# Patient Record
Sex: FEMALE | Race: WHITE | NOT HISPANIC OR LATINO | Employment: FULL TIME | URBAN - NONMETROPOLITAN AREA
[De-identification: names, ages, dates, MRNs, and addresses within clinical notes are randomized per-mention and may not be internally consistent; named-entity substitution may affect disease eponyms.]

---

## 2023-06-26 ENCOUNTER — EVALUATION (OUTPATIENT)
Dept: PHYSICAL THERAPY | Facility: CLINIC | Age: 63
End: 2023-06-26
Payer: COMMERCIAL

## 2023-06-26 DIAGNOSIS — Z96.651 PRESENCE OF RIGHT ARTIFICIAL KNEE JOINT: ICD-10-CM

## 2023-06-26 DIAGNOSIS — M25.561 ACUTE PAIN OF RIGHT KNEE: Primary | ICD-10-CM

## 2023-06-26 PROCEDURE — 97110 THERAPEUTIC EXERCISES: CPT | Performed by: PHYSICAL THERAPIST

## 2023-06-26 PROCEDURE — 97161 PT EVAL LOW COMPLEX 20 MIN: CPT | Performed by: PHYSICAL THERAPIST

## 2023-06-26 NOTE — LETTER
2023    Karen Michael, [address] [address]    Patient: Sary Jenkins   YOB: 1960   Date of Visit: 2023     Encounter Diagnosis     ICD-10-CM    1  Acute pain of right knee  M25 561       2  Presence of right artificial knee joint  Z96 651           Dear Dr Esha Hill:    Thank you for your recent referral of Sary Jenkins  Please review the attached evaluation summary from Angie's recent visit  Please verify that you agree with the plan of care by signing the attached order  If you have any questions or concerns, please do not hesitate to call  I sincerely appreciate the opportunity to share in the care of one of your patients and hope to have another opportunity to work with you in the near future  Sincerely,    Juan Carlos De La Rosa      Referring Provider:      I certify that I have read the below Plan of Care and certify the need for these services furnished under this plan of treatment while under my care  Karen Michael MD  [address] [place] [address]  Via Fax: [phone]          PT Evaluation     Today's date: 2023  Patient name: Sary Jenkins  : 1960  MRN: [id number]  Referring provider: Denisse Robledo MD  Dx:   Encounter Diagnosis     ICD-10-CM    1  Acute pain of right knee  M25 561       2  Presence of right artificial knee joint  Z96 651           Start Time: 1300  Stop Time: 1410  Total time in clinic (min): 70 minutes    Assessment  Assessment details: Patient presents to outpatient clinic post op R TKA  Patient tolerated the evaluation well  Patient showed good quadriceps contraction and no quad lag during a straight leg raise  Patient is lacking 8 degrees of knee extension on the left but is able to reach 90 degrees of knee flexion actively   Patient showed good patellar mobility medially and laterally but is lacking mobility inferiorly and superiorly that may be due to the edema currently present around the knee  Patient was instructed on adjusting exercises she was given before the operation and was instructed on lowering the dosage to allow time for the R knee to heal  Patient was also instructed on normal healing times for the knee and how it will progress during physical therapy  Impairments: activity intolerance, lacks appropriate home exercise program and pain with function    Goals  STGs    Patient will increase right knee flexion AROM from 91 to 110 degrees in 3 weeks to be able to use stairs with a step through pattern  Patient will increase right knee extension AROM from -8 to -3 degrees in 3 weeks to be able to tolerate normal ADL's  Patient will increase right knee flexion strength from 4 to 4+ by discharge in 3 weeks to be able to tolerate normal ADL's  LTGs    Patient will increase FOTO score from 41 to 77 by discharge to be able to complete ADL's  Patient will increase right knee extension strength from 4 to 5 by discharge to be able to tolerate normal ADL's  Patient will decrease R knee edema from 39 8 cm to 34 cm by discharge to be able to complete ADL's as normal     Plan  Plan details: Patient will have deficits addressed that were found in the evaluation today  Patient will benefit from PT to address R knee ROM and strength to be able to get back to walking their normal mileage and their normal ADL's  Planned modality interventions: cryotherapy  Planned therapy interventions: manual therapy, balance/weight bearing training, neuromuscular re-education, strengthening, stretching, therapeutic activities, therapeutic exercise and home exercise program  Frequency: 2x week  Duration in weeks: 6  Plan of Care beginning date: 6/26/2023  Plan of Care expiration date: 8/7/2023        Subjective Evaluation    History of Present Illness  Mechanism of injury: surgery  Mechanism of injury: Patient presents to outpatient clinic post op R TKA   Patient reports doing "well since the operation on 6/15/23 and is currently having difficulty managing the post op swelling  Patient reports they started exercises provided by their physician before the surgery and they have been continuing them  Patient expresses walking upstairs and standing for more than 20 minutes at a time has been difficult for them  Patient disclosed prior medical history of diabetes and thyroid problems  Quality of life: good    Pain  Current pain ratin  At best pain ratin  At worst pain ratin  Quality: tight, discomfort and cramping  Aggravating factors: stair climbing, walking and standing    Social Support  Steps to enter house: yes  Stairs in house: yes   Lives in: multiple-level home    Treatments  Previous treatment: medication  Current treatment: medication and physical therapy  Patient Goals  Patient goals for therapy: decreased edema, decreased pain and increased strength  Patient goal: Be able to sleep at night without pain        Objective     Observations     Right Knee   Positive for edema, effusion and incision  Negative for drainage  Additional Observation Details  Redness ivan incision  Small not raised dots extending full length of incision approximately 3/4\" wide which patient discloses is where the bandage covering the staples and incision was located      Active Range of Motion   Left Knee   Flexion: 141 degrees   Extension: 5 degrees     Right Knee   Flexion: 91 degrees   Extension: -9 degrees   Extensor la degrees     Passive Range of Motion   Left Knee   Extension: 5 degrees     Right Knee   Extension: -8 degrees     Mobility   Patellar Mobility:     Right Knee   WFL: medial and lateral  Hypomobile: superior and inferior     Strength/Myotome Testing     Left Knee   Quadriceps contraction: good    Right Knee   Flexion: 4  Extension: 4  Quadriceps contraction: good    Swelling     Left Knee Girth Measurement (cm)   Joint line: 34 cm    Right Knee Girth Measurement (cm) " "  Joint line: 39 8 cm      Flowsheet Rows    Flowsheet Row Most Recent Value   PT/OT G-Codes    Current Score 44   Projected Score 77            Precautions: N/A    Date 6/26 IE       FOTO        Manuals        Passive knee ext NV       Gastroc stretch NV                       Neuro Re-Ed        SLR NV                                                       Ther Ex        Heel slide 5\" 15x       Towel crush        Quad set                                                Ther Activity        Step Ups NV               Gait Training                        Modalities                                              "

## 2023-06-26 NOTE — PROGRESS NOTES
PT Evaluation     Today's date: 2023  Patient name: Neo Isbell  : 1960  MRN: [id number]  Referring provider: Samantha Barnhart MD  Dx:   Encounter Diagnosis     ICD-10-CM    1  Acute pain of right knee  M25 561       2  Presence of right artificial knee joint  Z96 651           Start Time: 1300  Stop Time: 1410  Total time in clinic (min): 70 minutes    Assessment  Assessment details: Patient presents to outpatient clinic post op R TKA  Patient tolerated the evaluation well  Patient showed good quadriceps contraction and no quad lag during a straight leg raise  Patient is lacking 8 degrees of knee extension on the left but is able to reach 90 degrees of knee flexion actively  Patient showed good patellar mobility medially and laterally but is lacking mobility inferiorly and superiorly that may be due to the edema currently present around the knee  Patient was instructed on adjusting exercises she was given before the operation and was instructed on lowering the dosage to allow time for the R knee to heal  Patient was also instructed on normal healing times for the knee and how it will progress during physical therapy  Impairments: activity intolerance, lacks appropriate home exercise program and pain with function    Goals  STGs    Patient will increase right knee flexion AROM from 91 to 110 degrees in 3 weeks to be able to use stairs with a step through pattern  Patient will increase right knee extension AROM from -8 to -3 degrees in 3 weeks to be able to tolerate normal ADL's  Patient will increase right knee flexion strength from 4 to 4+ by discharge in 3 weeks to be able to tolerate normal ADL's  LTGs    Patient will increase FOTO score from 41 to 77 by discharge to be able to complete ADL's  Patient will increase right knee extension strength from 4 to 5 by discharge to be able to tolerate normal ADL's    Patient will decrease R knee edema from 39 8 cm to 34 cm by discharge to be able to complete ADL's as normal     Plan  Plan details: Patient will have deficits addressed that were found in the evaluation today  Patient will benefit from PT to address R knee ROM and strength to be able to get back to walking their normal mileage and their normal ADL's  Planned modality interventions: cryotherapy  Planned therapy interventions: manual therapy, balance/weight bearing training, neuromuscular re-education, strengthening, stretching, therapeutic activities, therapeutic exercise and home exercise program  Frequency: 2x week  Duration in weeks: 6  Plan of Care beginning date: 2023  Plan of Care expiration date: 2023        Subjective Evaluation    History of Present Illness  Mechanism of injury: surgery  Mechanism of injury: Patient presents to outpatient clinic post op R TKA  Patient reports doing well since the operation on 6/15/23 and is currently having difficulty managing the post op swelling  Patient reports they started exercises provided by their physician before the surgery and they have been continuing them  Patient expresses walking upstairs and standing for more than 20 minutes at a time has been difficult for them  Patient disclosed prior medical history of diabetes and thyroid problems  Quality of life: good    Pain  Current pain ratin  At best pain ratin  At worst pain ratin  Quality: tight, discomfort and cramping  Aggravating factors: stair climbing, walking and standing    Social Support  Steps to enter house: yes  Stairs in house: yes   Lives in: multiple-level home    Treatments  Previous treatment: medication  Current treatment: medication and physical therapy  Patient Goals  Patient goals for therapy: decreased edema, decreased pain and increased strength  Patient goal: Be able to sleep at night without pain        Objective     Observations     Right Knee   Positive for edema, effusion and incision  Negative for drainage       Additional Observation "Details  Redness ivan incision  Small not raised dots extending full length of incision approximately 3/4\" wide which patient discloses is where the bandage covering the staples and incision was located      Active Range of Motion   Left Knee   Flexion: 141 degrees   Extension: 5 degrees     Right Knee   Flexion: 91 degrees   Extension: -9 degrees   Extensor la degrees     Passive Range of Motion   Left Knee   Extension: 5 degrees     Right Knee   Extension: -8 degrees     Mobility   Patellar Mobility:     Right Knee   WFL: medial and lateral  Hypomobile: superior and inferior     Strength/Myotome Testing     Left Knee   Quadriceps contraction: good    Right Knee   Flexion: 4  Extension: 4  Quadriceps contraction: good    Swelling     Left Knee Girth Measurement (cm)   Joint line: 34 cm    Right Knee Girth Measurement (cm)   Joint line: 39 8 cm      Flowsheet Rows    Flowsheet Row Most Recent Value   PT/OT G-Codes    Current Score 44   Projected Score 77             Precautions: N/A    Date  IE       FOTO        Manuals        Passive knee ext NV       Gastroc stretch NV                       Neuro Re-Ed        SLR NV                                                       Ther Ex        Heel slide 5\" 15x       Towel crush        Quad set                                                Ther Activity        Step Ups NV               Gait Training                        Modalities                             "

## 2023-07-05 ENCOUNTER — OFFICE VISIT (OUTPATIENT)
Dept: PHYSICAL THERAPY | Facility: CLINIC | Age: 63
End: 2023-07-05
Payer: COMMERCIAL

## 2023-07-05 DIAGNOSIS — Z96.651 PRESENCE OF RIGHT ARTIFICIAL KNEE JOINT: ICD-10-CM

## 2023-07-05 DIAGNOSIS — M25.561 ACUTE PAIN OF RIGHT KNEE: Primary | ICD-10-CM

## 2023-07-05 PROCEDURE — 97140 MANUAL THERAPY 1/> REGIONS: CPT | Performed by: PHYSICAL THERAPIST

## 2023-07-05 PROCEDURE — 97110 THERAPEUTIC EXERCISES: CPT | Performed by: PHYSICAL THERAPIST

## 2023-07-05 NOTE — PROGRESS NOTES
Daily Note     Today's date: 2023  Patient name: Martina Verduzco  : 1960  MRN: [id number]  Referring provider: Nicky Menjivar MD  Dx:   Encounter Diagnosis     ICD-10-CM    1. Acute pain of right knee  M25.561       2. Presence of right artificial knee joint  Z96.651           Start Time: 1400  Stop Time: 1455  Total time in clinic (min): 55 minutes    Subjective: Patient reports knee and ankle is stiff. Patient reports difficulty sleeping due to an increase in pain in the R knee. Patient is continuing to have difficulty walking for more than 20 minutes and climbing stairs with a normal pattern. Objective: See treatment diary below      Assessment: Tolerated treatment well. Patient demonstrated fatigue post treatment. Patient showed good quad strength with exercises. Patient showed some difficulty with initial heel slides secondary to tightness in the knee. Patient showed improvement in knee ROM as they continued heel slides. Patient showed good hip and knee control during SLB and tandem stance. Patient was instructed on continuing home exercises provided to them but to limit over doing exercises. Plan: Continue per plan of care.       Precautions: N/A    Date  IE       FOTO        Manuals        Passive knee ext NV ZJ 5 min      Gastroc stretch NV ZJ 30" 4x & ham                      Neuro Re-Ed        SLR NV 20x      SLB  5x 10"      Tandem stance  5x 10" ea                                      Ther Ex        Heel slide 5" 15x 20x 5"      Towel crush  20x 5"      Quad set        NuStep for strengthening  L5 8 min                                      Ther Activity        Step Ups NV 20x              Gait Training        SPC  8 min              Modalities

## 2023-07-07 ENCOUNTER — OFFICE VISIT (OUTPATIENT)
Dept: PHYSICAL THERAPY | Facility: CLINIC | Age: 63
End: 2023-07-07
Payer: COMMERCIAL

## 2023-07-07 DIAGNOSIS — Z96.651 PRESENCE OF RIGHT ARTIFICIAL KNEE JOINT: Primary | ICD-10-CM

## 2023-07-07 DIAGNOSIS — M25.561 ACUTE PAIN OF RIGHT KNEE: ICD-10-CM

## 2023-07-07 PROCEDURE — 97110 THERAPEUTIC EXERCISES: CPT

## 2023-07-07 PROCEDURE — 97140 MANUAL THERAPY 1/> REGIONS: CPT

## 2023-07-07 PROCEDURE — 97112 NEUROMUSCULAR REEDUCATION: CPT

## 2023-07-07 NOTE — PROGRESS NOTES
Daily Note     Today's date: 2023  Patient name: Sweta Vega  : 1960  MRN: [id number]  Referring provider: Curtis Hernandez MD  Dx:   Encounter Diagnosis     ICD-10-CM    1. Presence of right artificial knee joint  Z96.651       2. Acute pain of right knee  M25.561           Start Time: 1100  Stop Time: 1200  Total time in clinic (min): 60 minutes    Subjective: I have stiffness in my knee today. I am still using the walker. Objective: See treatment diary below      Assessment: Tolerated treatment well. Patient would benefit from continued PT  PT was able to get to 112 of knee flexion today using the green strap. We added some new exercises today with good tolerance. We continue to work on her motion and strength. She is able to walk with the cane for shorter distances. Her extension is improving , but, still gets strong pain with passive ext. Pt was shown how to do some stretching for her calf and knee at home. We ended with a cold pack to her right knee for 10 min in supine. Plan: Continue per plan of care.       Precautions: N/A    Date  IE      FOTO        Manuals        Passive knee ext NV ZJ 5 min 5 min JF     Gastroc stretch NV ZJ 30" 4x & ham JF  4 x 30 "                     Neuro Re-Ed        SLR NV 20x 20 x     SLB  5x 10" 5 x 10 "      Tandem stance  5x 10" ea 5 x 10 " ea     Tandem walking   5 laps                             Ther Ex        Heel slide 5" 15x 20x 5" 20 x 5 "      Towel crush  20x 5" 20 x  5 "     LAQ   1 # 15 X      Quad set        NuStep for strengthening  L5 8 min L 5  8min     Heel raise   20 x     Standing abd    20x ea     SHC   20 x      Hokie Pokie   20 x      Ther Activity        Step Ups NV 20x 20x  6 "              Gait Training        SPC  8 min              Modalities

## 2023-07-11 ENCOUNTER — OFFICE VISIT (OUTPATIENT)
Dept: PHYSICAL THERAPY | Facility: CLINIC | Age: 63
End: 2023-07-11
Payer: COMMERCIAL

## 2023-07-11 DIAGNOSIS — Z96.651 PRESENCE OF RIGHT ARTIFICIAL KNEE JOINT: Primary | ICD-10-CM

## 2023-07-11 PROCEDURE — 97140 MANUAL THERAPY 1/> REGIONS: CPT

## 2023-07-11 PROCEDURE — 97110 THERAPEUTIC EXERCISES: CPT

## 2023-07-11 PROCEDURE — 97112 NEUROMUSCULAR REEDUCATION: CPT

## 2023-07-11 NOTE — PROGRESS NOTES
Daily Note     Today's date: 2023  Patient name: Viri Moran  : 1960  MRN: [id number]  Referring provider: Irvin Figueroa MD  Dx:   Encounter Diagnosis     ICD-10-CM    1. Presence of right artificial knee joint  Z96.651           Start Time: 1100  Stop Time: 1200  Total time in clinic (min): 60 minutes    Subjective: My knee is stiff today. I have swelling in it. Objective: See treatment diary below      Assessment: Tolerated treatment well. Patient would benefit from continued PT pt reports having more pain today with knee flexion due to more swelling. She was able to get to 110 of passive knee flexion using the SOS strap. Pt continues to use the walker and cane for ambulation. She had discoloration down her right leg into her right ankle. We  Continue to work on her motion and strength. She is doing her exercises at home. We iced post for 10 min. Pt walked out of the clinic today using her cane. She did need a few verbal cues , but, did well. Plan: Continue per plan of care.       Precautions: N/A    Date  IE     FOTO        Manuals        Passive knee ext NV ZJ 5 min 5 min JF 5 min    Gastroc stretch NV ZJ 30" 4x & ham JF  4 x 30 "  JF 4 x 30 "                     Neuro Re-Ed        SLR NV 20x 20 x 20 x    SLB  5x 10" 5 x 10 "  5 " 10 x    Tandem stance  5x 10" ea 5 x 10 " ea 5 " 10 x    Tandem walking   5 laps 5 laps                            Ther Ex        Heel slide 5" 15x 20x 5" 20 x 5 "  20 x 5 "    Towel crush  20x 5" 20 x  5 " 20 x 5 "     LAQ   1 # 15 X  2 # 20 x    Quad set        NuStep for strengthening  L5 8 min L 5  8min L 5 8 min    Heel raise   20 x 20 x    Standing abd   ext   20x ea  20 x each    SHC   20 x  20 x     Hokie Pokie   20 x  20 x    Ther Activity        Step Ups NV 20x 20x  6 "  20 x 6 "             Gait Training        SPC  8 min              Modalities

## 2023-07-13 ENCOUNTER — OFFICE VISIT (OUTPATIENT)
Dept: PHYSICAL THERAPY | Facility: CLINIC | Age: 63
End: 2023-07-13
Payer: COMMERCIAL

## 2023-07-13 DIAGNOSIS — M25.561 ACUTE PAIN OF RIGHT KNEE: Primary | ICD-10-CM

## 2023-07-13 DIAGNOSIS — Z96.651 PRESENCE OF RIGHT ARTIFICIAL KNEE JOINT: ICD-10-CM

## 2023-07-13 PROCEDURE — 97140 MANUAL THERAPY 1/> REGIONS: CPT

## 2023-07-13 PROCEDURE — 97110 THERAPEUTIC EXERCISES: CPT

## 2023-07-13 PROCEDURE — 97112 NEUROMUSCULAR REEDUCATION: CPT

## 2023-07-13 NOTE — PROGRESS NOTES
Daily Note     Today's date: 2023  Patient name: Hanane Shah  : 1960  MRN: [id number]  Referring provider: Arun Escalante MD  Dx:   Encounter Diagnosis     ICD-10-CM    1. Acute pain of right knee  M25.561       2. Presence of right artificial knee joint  Z96.651           Start Time: 1400  Stop Time: 1505  Total time in clinic (min): 65 minutes    Subjective: I dont have the pain in the back of my knee now . I do feel the tightness over the front of my knee. Objective: See treatment diary below      Assessment: Tolerated treatment well. Patient would benefit from continued PT  Pt ambulates with a cane today. She began the bike today and was only able to rock for the first 2 min then was able to make a full revolution. She was able to complete 8 min. She is doing better with the exercises and was able to use 2# today for standing abduction and ext. We will continue to progress as able. We worked on her ext today and is doing well. We also worked on her flexion and was able to get 115 using the green strap to pull. We ended with ice for 10 min to right knee in supine. Plan: Continue per plan of care.       Precautions: N/A    Date  IE    FOTO        Manuals        Passive knee ext NV ZJ 5 min 5 min JF 5 min 5 min    Gastroc stretch NV ZJ 30" 4x & ham JF  4 x 30 "  JF 4 x 30 "  Jf 4 x 30                   Neuro Re-Ed        SLR NV 20x 20 x 20 x   20 x   SLB  5x 10" 5 x 10 "  5 " 10 x 5 " 10 x   Tandem stance  5x 10" ea 5 x 10 " ea 5 " 10 x 4x   15 "    Tandem walking   5 laps 5 laps 5 laps   Bike      8 min                    Ther Ex        Heel slide 5" 15x 20x 5" 20 x 5 "  20 x 5 " 20 x 5 "    Towel crush  20x 5" 20 x  5 " 20 x 5 "  20 x 5 "    LAQ   1 # 15 X  2 # 20 x 2 # 20 x   Quad set        NuStep for strengthening  L5 8 min L 5  8min L 5 8 min    Heel raise   20 x 20 x 20 x   Standing abd   ext   20x ea  20 x each 20 x  2#   SHC   20 x  20 x  20 x   Hokie Wilfred   20 x  20 x 20 x   Ther Activity        Step Ups NV 20x 20x  6 "  20 x 6 "  20 x 6 "            Gait Training        SPC  8 min              Modalities

## 2023-07-18 ENCOUNTER — OFFICE VISIT (OUTPATIENT)
Dept: PHYSICAL THERAPY | Facility: CLINIC | Age: 63
End: 2023-07-18
Payer: COMMERCIAL

## 2023-07-18 DIAGNOSIS — M25.561 ACUTE PAIN OF RIGHT KNEE: ICD-10-CM

## 2023-07-18 DIAGNOSIS — Z96.651 PRESENCE OF RIGHT ARTIFICIAL KNEE JOINT: Primary | ICD-10-CM

## 2023-07-18 PROCEDURE — 97140 MANUAL THERAPY 1/> REGIONS: CPT

## 2023-07-18 PROCEDURE — 97112 NEUROMUSCULAR REEDUCATION: CPT

## 2023-07-18 PROCEDURE — 97110 THERAPEUTIC EXERCISES: CPT

## 2023-07-18 NOTE — PROGRESS NOTES
Daily Note     Today's date: 2023  Patient name: Goyo Tanner  : 1960  MRN: [id number]  Referring provider: Thais Aleman MD  Dx:   Encounter Diagnosis     ICD-10-CM    1. Presence of right artificial knee joint  Z96.651       2. Acute pain of right knee  M25.561           Start Time: 1400  Stop Time: 1500  Total time in clinic (min): 60 minutes    Subjective: I still have trouble sleeping at night. It does get sore. Objective: See treatment diary below      Assessment: Tolerated treatment well. Patient would benefit from continued PT  We tried to use the leg ext machine today , but, reports having pain. Tomi Brown we will try to use the machine in the next few visits. Pt is doing well with her ambulation and motion. She is improving with her endurance and strength. Pt still has mild swelling today and discoloration in her knee. She reports walking and standing more at home. We ended with ice for 10 post to her right knee. Plan: Continue per plan of care.       Precautions: N/A    Date    FOTO        Manuals        Passive knee ext 5 min ZJ 5 min 5 min JF 5 min 5 min    Gastroc stretch Jf 4x  30 x ZJ 30" 4x & ham JF  4 x 30 "  JF 4 x 30 "  Jf 4 x 30                   Neuro Re-Ed        SLR 20 x 20x 20 x 20 x   20 x   SLB 10 " 5 x   foam 5x 10" 5 x 10 "  5 " 10 x 5 " 10 x   Tandem stance 4 x 10 x 5x 10" ea 5 x 10 " ea 5 " 10 x 4x   15 "    Tandem walking 5 laps airex  5 laps 5 laps 5 laps   Bike  8 min Nustep L 6     8 min                    Ther Ex        Heel slide 5" 15x 20x 5" 20 x 5 "  20 x 5 " 20 x 5 "    Towel crush  20x 5" 20 x  5 " 20 x 5 "  20 x 5 "    LAQ 2# 20 x  1 # 15 X  2 # 20 x 2 # 20 x   Quad set        NuStep for strengthening L 6  8 min  L 5  8min L 5 8 min    Heel raise 30 x  20 x 20 x 20 x   Standing abd   ext 20 x 2 #   20x ea  20 x each 20 x  2#   SHC 20x  2 #   20 x  20 x  20 x   Hokie Pokie 20 x  20 x  20 x 20 x   Ther Activity        Step Ups 6 "  30 x 20x 20x  6 "  20 x 6 "  20 x 6 "            Gait Training        SPC  8 min              Modalities

## 2023-07-20 ENCOUNTER — OFFICE VISIT (OUTPATIENT)
Dept: PHYSICAL THERAPY | Facility: CLINIC | Age: 63
End: 2023-07-20
Payer: COMMERCIAL

## 2023-07-20 DIAGNOSIS — Z96.651 PRESENCE OF RIGHT ARTIFICIAL KNEE JOINT: Primary | ICD-10-CM

## 2023-07-20 PROCEDURE — 97140 MANUAL THERAPY 1/> REGIONS: CPT

## 2023-07-20 PROCEDURE — 97112 NEUROMUSCULAR REEDUCATION: CPT

## 2023-07-20 PROCEDURE — 97110 THERAPEUTIC EXERCISES: CPT

## 2023-07-20 NOTE — PROGRESS NOTES
Daily Note     Today's date: 2023  Patient name: Daniel Yoon  : 1960  MRN: [id number]  Referring provider: Nelson Do MD  Dx:   Encounter Diagnosis     ICD-10-CM    1. Presence of right artificial knee joint  Z96.651           Start Time: 1400  Stop Time: 1500  Total time in clinic (min): 60 minutes    Subjective: I am doing well today. My knee is coming along. .      Objective: See treatment diary below      Assessment: Tolerated treatment well. Patient would benefit from continued PT  Pt continues to use a single point cane for ambulation. Pt has some mild to mild plus swelling in her right knee. She has some discoloration in her right leg ,but, is getting less. We began hamstring curls today with good tolerance. We worked on her strength and motion . She still gets strong pain with passive knee ext. She reports being very tender over her scar with STM today. We ended with a cold pack today for 10 min in supine. Plan: Continue per plan of care.       Precautions: N/A    Date    FOTO  63      Manuals        Passive knee ext 5 min ZJ 5 min 5 min JF 5 min 5 min    Gastroc stretch Jf 4x  30 x JF 30" 4x & ham JF  4 x 30 "  JF 4 x 30 "  Jf 4 x 30   bridge  20x  3 "       SAQ   3 # 20x  3 "       Neuro Re-Ed        SLR 20 x 20x 20 x 20 x   20 x   SLB 10 " 5 x   foam 5x 10" 5 x 10 "  5 " 10 x 5 " 10 x   Tandem stance 4 x 10 x 5x 10" ea 5 x 10 " ea 5 " 10 x 4x   15 "    Tandem walking 5 laps airex 5 laps airex 5 laps 5 laps 5 laps   Bike  8 min Nustep L 6  4 min L 5   8 min                    Ther Ex        Heel slide 5" 15x 20x 5" 20 x 5 "  20 x 5 " 20 x 5 "    Towel crush  20x 5" 20 x  5 " 20 x 5 "  20 x 5 "    LAQ 2# 20 x 3 # 20 x  5 " 1 # 15 X  2 # 20 x 2 # 20 x   Quad set        NuStep for strengthening L 6  8 min L 6 5 min L 5  8min L 5 8 min    Heel raise 30 x 30 x 20 x 20 x 20 x   Standing abd   ext 20 x 2 #  2#  20 x 20x ea  20 x each 20 x  2#   SHC 20x  2 # Machine ham curls 25# 20 x 20 x  20 x  20 x   Hokie Pokie 20 x 20 x 20 x  20 x 20 x   Ther Activity        Step Ups 6 "  30 x 30 x 6 "  20x  6 "  20 x 6 "  20 x 6 "            Gait Training        SPC  8 min              Modalities

## 2023-07-25 ENCOUNTER — OFFICE VISIT (OUTPATIENT)
Dept: PHYSICAL THERAPY | Facility: CLINIC | Age: 63
End: 2023-07-25
Payer: COMMERCIAL

## 2023-07-25 DIAGNOSIS — Z96.651 PRESENCE OF RIGHT ARTIFICIAL KNEE JOINT: Primary | ICD-10-CM

## 2023-07-25 PROCEDURE — 97140 MANUAL THERAPY 1/> REGIONS: CPT

## 2023-07-25 PROCEDURE — 97110 THERAPEUTIC EXERCISES: CPT

## 2023-07-25 PROCEDURE — 97112 NEUROMUSCULAR REEDUCATION: CPT

## 2023-07-25 NOTE — PROGRESS NOTES
Daily Note     Today's date: 2023  Patient name: Steve Rock  : 1960  MRN: [id number]  Referring provider: Nicloe Mcadams MD  Dx:   Encounter Diagnosis     ICD-10-CM    1. Presence of right artificial knee joint  Z96.651           Start Time: 1500  Stop Time: 1600  Total time in clinic (min): 60 minutes    Subjective: I am walking better. I did get some pain after standing in one place for too long. Objective: See treatment diary below      Assessment: Tolerated treatment well. Patient would benefit from continued PT  Pt was able to increase wt for ham curls to 30 # with good tolerance. Pt had 116 of flexion today using the green strap. Pt was able to keep her knee straight with her SLR today . Pt has better tolerance with passive knee ext. We continue to work on her motion and strength. She is doing better with step ups. We ended with ice for 8 min to her right knee. Plan: Continue per plan of care.       Precautions: N/A    Date    FOTO  63      Manuals        Passive knee ext 5 min ZJ 5 min 5 min JF 5 min 5 min    Gastroc stretch Jf 4x  30 x JF 30" 4x & ham JF  4 x 30 "  JF 4 x 30 "  Jf 4 x 30   bridge  20x  3 "  20 x 3 "      SAQ   3 # 20x  3 "  3 # 29 x 3 "      Neuro Re-Ed        SLR 20 x 20x 20 x 20 x   20 x   SLB 10 " 5 x   foam 5x 10" 5 x 10 "  5 " 10 x 5 " 10 x   Tandem stance 4 x 10 x 5x 10" ea 5 x 10 " ea 5 " 10 x 4x   15 "    Tandem walking 5 laps airex 5 laps airex 5 laps  airex  5 laps 5 laps   Bike  8 min Nustep L 6  4 min L 5 4 min L 5   8 min                    Ther Ex        Heel slide 5" 15x 20x 5" 20 x 5 "  20 x 5 " 20 x 5 "    Towel crush  20x 5" 20 x  5 " 20 x 5 "  20 x 5 "    LAQ 2# 20 x 3 # 20 x  5 " 1 # 15 X  2 # 20 x 2 # 20 x   Quad set   Squats  20 x     NuStep for strengthening L 6  8 min L 6 5 min L 5  8min L 5 8 min    Heel raise 30 x 30 x 20 x 20 x 20 x   Standing abd   ext 20 x 2 #  2#  20 x 20x ea  2 #   20 x each 20 x  2# [address] 20x  2 #  Machine ham curls 25# 20 x Machine ham curls 30 # 20 x 20 x  20 x   H[place]ie Pokie 20 x 20 x 20 x  20 x 20 x   Ther Activity        Step Ups 6 "  30 x 30 x 6 "  20x  6 "  20 x 6 "  30 x 6 "            Gait Training        SPC  8 min              Modalities

## 2023-07-27 ENCOUNTER — OFFICE VISIT (OUTPATIENT)
Dept: PHYSICAL THERAPY | Facility: CLINIC | Age: 63
End: 2023-07-27
Payer: COMMERCIAL

## 2023-07-27 DIAGNOSIS — Z96.651 PRESENCE OF RIGHT ARTIFICIAL KNEE JOINT: Primary | ICD-10-CM

## 2023-07-27 PROCEDURE — 97140 MANUAL THERAPY 1/> REGIONS: CPT

## 2023-07-27 PROCEDURE — 97110 THERAPEUTIC EXERCISES: CPT

## 2023-07-27 PROCEDURE — 97112 NEUROMUSCULAR REEDUCATION: CPT

## 2023-07-27 NOTE — PROGRESS NOTES
Daily Note     Today's date: 2023  Patient name: Sharath Iglesias  : 1960  MRN: [id number]  Referring provider: Juan Jose Bacon MD  Dx:   Encounter Diagnosis     ICD-10-CM    1. Presence of right artificial knee joint  Z96.651           Start Time: 1400  Stop Time: 1505  Total time in clinic (min): 65 minutes    Subjective: I had some pain over the inside of my right knee after the last visit. Objective: See treatment diary below      Assessment: Tolerated treatment well. Patient would benefit from continued PT  Pt requested to decrease the wt for ham curls back to 25 # pt did well and had no problems. Pt still has some tenderness over her scar and we had some popping over the scar during massage today. We are working on her motion and strength. She still lacks full active knee ext and is close to full ext passively. We ended with a cold pack post for 10 min to her right knee. Plan: Continue per plan of care.       Precautions: N/A    Date    FOTO  63      Manuals        Passive knee ext 5 min ZJ 5 min 5 min JF 5 min 5 min    Gastroc stretch ham and quad Jf 4x  30 x JF 30" 4x & ham JF  4 x 30 "  JF  10 min Jf 4 x 30   bridge  20x  3 "  20 x 3 "  20 x 3 "     SAQ   3 # 20x  3 "  3 # 29 x 3 "  3 # 20 x 3 "     Neuro Re-Ed        SLR 20 x 20x 20 x 20 x   20 x   SLB 10 " 5 x   foam 5x 10" 5 x 10 "  5 " 10 x 5 " 10 x   Tandem stance 4 x 10 x 5x 10" ea 5 x 10 " ea 5 " 10 x 4x   15 "    Tandem walking 5 laps airex 5 laps airex 5 laps  airex  5 laps 5 laps   Bike  8 min Nustep L 6  4 min L 5 4 min L 5  nustep 8 min L 5 8 min                    Ther Ex        Heel slide 5" 15x 20x 5" 20 x 5 "  20 x 5 " 20 x 5 "    Towel crush  20x 5" 20 x  5 "  20 x 5 "    LAQ 2# 20 x 3 # 20 x  5 " 1 # 15 X  3 # 20 x 2 # 20 x   Quad set   Squats  20 x Squats 20 x    NuStep for strengthening L 6  8 min L 6 5 min L 5  8min L 5 8 min    Heel raise 30 x 30 x 20 x 20 x 20 x   Standing abd   ext 20 x 2 #  2#  20 x 20x ea  2 #   20 x each 20 x  2#   [address] 20x  2 #  Machine ham curls 25# 20 x Machine ham curls 30  # 20 x Hamstring machine 25 #  20 x 20 x   Hokie Pokie 20 x 20 x 20 x  20 x 20 x   Ther Activity        Step Ups 6 "  30 x 30 x 6 "  20x  6 "  20 x 6 "  30 x 6 "            Gait Training        SPC  8 min              Modalities

## 2023-08-01 ENCOUNTER — EVALUATION (OUTPATIENT)
Dept: PHYSICAL THERAPY | Facility: CLINIC | Age: 63
End: 2023-08-01
Payer: COMMERCIAL

## 2023-08-01 DIAGNOSIS — M25.561 ACUTE PAIN OF RIGHT KNEE: ICD-10-CM

## 2023-08-01 DIAGNOSIS — Z96.651 PRESENCE OF RIGHT ARTIFICIAL KNEE JOINT: Primary | ICD-10-CM

## 2023-08-01 PROCEDURE — 97110 THERAPEUTIC EXERCISES: CPT | Performed by: PHYSICAL THERAPIST

## 2023-08-01 PROCEDURE — 97112 NEUROMUSCULAR REEDUCATION: CPT | Performed by: PHYSICAL THERAPIST

## 2023-08-01 NOTE — LETTER
2023    Marlon Quiroz MD  [address] [facility] [address]    Patient: Yael La   YOB: 1960   Date of Visit: 2023     Encounter Diagnosis     ICD-10-CM    1. Presence of right artificial knee joint  Z96.651       2. Acute pain of right knee  M25.561           Dear Dr. Anthony Man:    Thank you for your recent referral of Yael aL. Please review the attached evaluation summary from Angie's recent visit. Please verify that you agree with the plan of care by signing the attached order. If you have any questions or concerns, please do not hesitate to call. I sincerely appreciate the opportunity to share in the care of one of your patients and hope to have another opportunity to work with you in the near future. Sincerely,    Jaylin Ramsay, PT      Referring Provider:      I certify that I have read the below Plan of Care and certify the need for these services furnished under this plan of treatment while under my care. Marlon Quiroz MD  [address] [facility] [address]  Via Fax: [phone]          PT Re-Evaluation     Today's date: 2023  Patient name: Yael La  : 1960  MRN: [id number]  Referring provider: Marlon Quiroz MD  Dx:   Encounter Diagnosis     ICD-10-CM    1. Presence of right artificial knee joint  Z96.651       2. Acute pain of right knee  M25.561           Start Time: 1400  Stop Time: 1500  Total time in clinic (min): 60 minutes    Assessment  Assessment details: Patient has attended 10 physical therapy visits to date. She is demonstrating improved mobility of the R knee along with improved mobility of the patella and scar during soft tissue assessment. There continues to be limited with knee flexion and extension strength as noted per the evaluation.  She is now ambulating without assistance of a device and is demonstrating improved heel toe gait pattern with decreased circumduction noted to the R LE. She was challenged with progression of therapy interventions today to address these identified deficits. Impairments: activity intolerance, lacks appropriate home exercise program and pain with function    Symptom irritability: lowUnderstanding of Dx/Px/POC: good   Prognosis: good    Goals  STGs    Patient will increase right knee flexion AROM from 91 to 110 degrees in 3 weeks to be able to use stairs with a step through pattern. - MET  Patient will increase right knee extension AROM from -8 to -3 degrees in 3 weeks to be able to tolerate normal ADL's. - MET  Patient will increase right knee flexion strength from 4 to 4+ by discharge in 3 weeks to be able to tolerate normal ADL's.- Progressing    LTGs    Patient will increase FOTO score from 41 to 77 by discharge to be able to complete ADL's. - Progressing  Patient will increase right knee extension strength from 4 to 5 by discharge to be able to tolerate normal ADL's. - Progressing  Patient will decrease R knee edema from 39.8 cm to 34 cm by discharge to be able to complete ADL's as normal. - Progressing    Plan  Plan details: Patient will have deficits addressed that were found in the evaluation today. Patient will benefit from PT to address R knee ROM and strength to be able to get back to walking their normal mileage and their normal ADL's. Patient informed that from this point forward, to ensure adherence to the aforementioned plan of care, all or some of the treatment may be performed and carried out by a Physical Therapy Assistant (PTA) with supervision from a licensed Physical Therapist (PT) in accordance with [address] [place] [address] [place]. From this point forward, all or some treatments may be carried out by a Physical Therapy Student (SPT) while under direct supervision from a licensed Physical Therapist (PT).       Planned modality interventions: cryotherapy  Planned therapy interventions: manual therapy, balance/weight bearing training, neuromuscular re-education, strengthening, stretching, therapeutic activities, therapeutic exercise and home exercise program  Frequency: 2x week  Duration in weeks: 4  Plan of Care beginning date: 2023  Plan of Care expiration date: 2023  Treatment plan discussed with: patient        Subjective Evaluation    History of Present Illness  Mechanism of injury: surgery  Mechanism of injury: Patient presents to outpatient clinic post op R TKA. Patient reports doing well since the operation on 6/15/23 and is currently having difficulty managing the post op swelling. Patient reports they started exercises provided by their physician before the surgery and they have been continuing them. Patient expresses walking upstairs and standing for more than 20 minutes at a time has been difficult for them. Patient disclosed prior medical history of diabetes and thyroid problems. Update 2023:  Patient reports that her knee is doing well. She feels that she is no longer relying on the cane for assistance. There is improved tolerance for walking and standing activities at home. She felt that the scar massage helped with her mobility and pain last session. She feels that standing in one spot for a long period of time will still be difficult for her to tolerate.    Quality of life: good    Patient Goals  Patient goals for therapy: decreased edema, decreased pain and increased strength  Patient goal: Be able to sleep at night without pain  Pain  Current pain ratin  At best pain ratin  At worst pain ratin  Location: R knee  Quality: tight, discomfort and cramping  Aggravating factors: stair climbing, walking and standing    Social Support  Steps to enter house: yes  Stairs in house: yes   Lives in: multiple-level home    Treatments  Previous treatment: medication  Current treatment: medication and physical therapy        Objective     Observations     Right Knee Negative for drainage, edema, effusion and incision.      Active Range of Motion   Left Knee   Flexion: 141 degrees   Extension: 5 degrees     Right Knee   Flexion: 123 degrees   Extension: -1 degrees     Passive Range of Motion   Left Knee   Extension: 5 degrees     Right Knee   Extension: -1 degrees     Mobility   Patellar Mobility:     Right Knee   WFL: medial, lateral, superior and inferior    Strength/Myotome Testing     Left Knee   Quadriceps contraction: good    Right Knee   Flexion: 4  Extension: 4  Quadriceps contraction: good            Precautions: N/A    Date 7/18 7/20 7/25 7/27 8/1 Reasses   FOTO  63      Manuals        Passive knee ext 5 min ZJ 5 min 5 min JF 5 min    Passive knee flexion     5 min   Gastroc stretch ham and quad Jf 4x  30 x JF 30" 4x & ham JF  4 x 30 "  JF  10 min    Neuro Re-Ed        SLR 20 x 20x 20 x 20 x    SLB 10 " 5 x   foam 5x 10" 5 x 10 "  5 " 10 x 30" 5x   Tandem stance 4 x 10 x 5x 10" ea 5 x 10 " ea 5 " 10 x    Tandem walking 5 laps airex 5 laps airex 5 laps  airex  5 laps 5 laps                   Ther Ex        Heel slide 5" 15x 20x 5" 20 x 5 "  20 x 5 "    Towel crush  20x 5" 20 x  5 "     LAQ 2# 20 x 3 # 20 x  5 " 1 # 15 X  3 # 20 x    Leg press   Squats  20 x Squats 20 x 40# 20x   Bike for ROM and strengthening L 6  8 min L 6 5 min L 5  8min L 5 8 min L5 8 min   Standing abd   ext 20 x 2 #  2#  20 x 20x ea  2 #   20 x each    Bike for strengthening     L5 8 min   SHC 20x  2 #  Machine ham curls 25# 20 x Machine ham curls 30  # 20 x Hamstring machine 25 #  20 x 35# 3x10   Ther Activity        Step Ups 6 "  30 x 30 x 6 "  20x  6 "  20 x 6 "  8" 20x   Bridge w/ LAQ     15x   Gait Training        SPC  8 min              Modalities

## 2023-08-01 NOTE — PROGRESS NOTES
PT Re-Evaluation     Today's date: 2023  Patient name: Jairo Moreno  : 1960  MRN: [id number]  Referring provider: Tara Noe MD  Dx:   Encounter Diagnosis     ICD-10-CM    1. Presence of right artificial knee joint  Z96.651       2. Acute pain of right knee  M25.561           Start Time: 1400  Stop Time: 1500  Total time in clinic (min): 60 minutes    Assessment  Assessment details: Patient has attended 10 physical therapy visits to date. She is demonstrating improved mobility of the R knee along with improved mobility of the patella and scar during soft tissue assessment. There continues to be limited with knee flexion and extension strength as noted per the evaluation. She is now ambulating without assistance of a device and is demonstrating improved heel toe gait pattern with decreased circumduction noted to the R LE. She was challenged with progression of therapy interventions today to address these identified deficits. Impairments: activity intolerance, lacks appropriate home exercise program and pain with function    Symptom irritability: lowUnderstanding of Dx/Px/POC: good   Prognosis: good    Goals  STGs    Patient will increase right knee flexion AROM from 91 to 110 degrees in 3 weeks to be able to use stairs with a step through pattern.  - MET  Patient will increase right knee extension AROM from -8 to -3 degrees in 3 weeks to be able to tolerate normal ADL's. - MET  Patient will increase right knee flexion strength from 4 to 4+ by discharge in 3 weeks to be able to tolerate normal ADL's.- Progressing    LTGs    Patient will increase FOTO score from 41 to 77 by discharge to be able to complete ADL's. - Progressing  Patient will increase right knee extension strength from 4 to 5 by discharge to be able to tolerate normal ADL's. - Progressing  Patient will decrease R knee edema from 39.8 cm to 34 cm by discharge to be able to complete ADL's as normal. - [address] [facility] details: Patient will have deficits addressed that were found in the evaluation today. Patient will benefit from PT to address R knee ROM and strength to be able to get back to walking their normal mileage and their normal ADL's. Patient informed that from this point forward, to ensure adherence to the aforementioned plan of care, all or some of the treatment may be performed and carried out by a Physical Therapy Assistant (PTA) with supervision from a licensed Physical Therapist (PT) in accordance with [address] [place] [address]. From this point forward, all or some treatments may be carried out by a Physical Therapy Student (SPT) while under direct supervision from a licensed Physical Therapist (PT). Planned modality interventions: cryotherapy  Planned therapy interventions: manual therapy, balance/weight bearing training, neuromuscular re-education, strengthening, stretching, therapeutic activities, therapeutic exercise and home exercise program  Frequency: 2x week  Duration in weeks: 4  Plan of Care beginning date: 8/1/2023  Plan of Care expiration date: 8/29/2023  Treatment plan discussed with: patient        Subjective Evaluation    History of Present Illness  Mechanism of injury: surgery  Mechanism of injury: Patient presents to outpatient clinic post op R TKA. Patient reports doing well since the operation on 6/15/23 and is currently having difficulty managing the post op swelling. Patient reports they started exercises provided by their physician before the surgery and they have been continuing them. Patient expresses walking upstairs and standing for more than 20 minutes at a time has been difficult for them. Patient disclosed prior medical history of diabetes and thyroid problems. Update 8/1/2023:  Patient reports that her knee is doing well. She feels that she is no longer relying on the cane for assistance.  There is improved tolerance for walking and standing activities at home. She felt that the scar massage helped with her mobility and pain last session. She feels that standing in one spot for a long period of time will still be difficult for her to tolerate. Quality of life: good    Patient Goals  Patient goals for therapy: decreased edema, decreased pain and increased strength  Patient goal: Be able to sleep at night without pain  Pain  Current pain ratin  At best pain ratin  At worst pain ratin  Location: R knee  Quality: tight, discomfort and cramping  Aggravating factors: stair climbing, walking and standing    Social Support  Steps to enter house: yes  Stairs in house: yes   Lives in: multiple-level home    Treatments  Previous treatment: medication  Current treatment: medication and physical therapy        Objective     Observations     Right Knee   Negative for drainage, edema, effusion and incision.      Active Range of Motion   Left Knee   Flexion: 141 degrees   Extension: 5 degrees     Right Knee   Flexion: 123 degrees   Extension: -1 degrees     Passive Range of Motion   Left Knee   Extension: 5 degrees     Right Knee   Extension: -1 degrees     Mobility   Patellar Mobility:     Right Knee   WFL: medial, lateral, superior and inferior    Strength/Myotome Testing     Left Knee   Quadriceps contraction: good    Right Knee   Flexion: 4  Extension: 4  Quadriceps contraction: good             Precautions: N/A    Date  Reasses   FOTO  63      Manuals        Passive knee ext 5 min ZJ 5 min 5 min JF 5 min    Passive knee flexion     5 min   Gastroc stretch ham and quad Jf 4x  30 x JF 30" 4x & ham JF  4 x 30 "  JF  10 min    Neuro Re-Ed        SLR 20 x 20x 20 x 20 x    SLB 10 " 5 x   foam 5x 10" 5 x 10 "  5 " 10 x 30" 5x   Tandem stance 4 x 10 x 5x 10" ea 5 x 10 " ea 5 " 10 x    Tandem walking 5 laps airex 5 laps airex 5 laps  airex  5 laps 5 laps                   Ther Ex        Heel slide 5" 15x 20x 5" 20 x 5 "  20 x 5 "    Towel crush 20x 5" 20 x  5 "     LAQ 2# 20 x 3 # 20 x  5 " 1 # 15 X  3 # 20 x    Leg press   Squats  20 x Squats 20 x 40# 20x   Bike for ROM and strengthening L 6  8 min L 6 5 min L 5  8min L 5 8 min L5 8 min   Standing abd   ext 20 x 2 #  2#  20 x 20x ea  2 #   20 x each    Bike for strengthening     L5 8 min   SHC 20x  2 #  Machine ham curls 25# 20 x Machine ham curls 30  # 20 x Hamstring machine 25 #  20 x 35# 3x10   Ther Activity        Step Ups 6 "  30 x 30 x 6 "  20x  6 "  20 x 6 "  8" 20x   Bridge w/ LAQ     15x   Gait Training        SPC  8 min              Modalities

## 2023-08-03 ENCOUNTER — OFFICE VISIT (OUTPATIENT)
Dept: PHYSICAL THERAPY | Facility: CLINIC | Age: 63
End: 2023-08-03
Payer: COMMERCIAL

## 2023-08-03 DIAGNOSIS — Z96.651 PRESENCE OF RIGHT ARTIFICIAL KNEE JOINT: Primary | ICD-10-CM

## 2023-08-03 PROCEDURE — 97112 NEUROMUSCULAR REEDUCATION: CPT

## 2023-08-03 PROCEDURE — 97110 THERAPEUTIC EXERCISES: CPT

## 2023-08-03 PROCEDURE — 97140 MANUAL THERAPY 1/> REGIONS: CPT

## 2023-08-03 NOTE — PROGRESS NOTES
Daily Note     Today's date: 8/3/2023  Patient name: Martina Verduzco  : 1960  MRN: [id number]  Referring provider: Nicky Menjivar MD  Dx:   Encounter Diagnosis     ICD-10-CM    1. Presence of right artificial knee joint  Z96.651           Start Time: 1400  Stop Time: 1505  Total time in clinic (min): 65 minutes    Subjective: I am doing better and I walked with out a cane today. Objective: See treatment diary below      Assessment: Tolerated treatment well. Patient would benefit from continued PT   Pt did well with her program . We are working on her right leg strength and motion. We worked on knee ext in between sets on the hamstring machine. Pt still reports having some trouble going down the stairs due to feeling tight in her knee. Pt still had some moderate pain with passive knee ext today. We ended with a cold pack post for 10 min. Plan: Continue per plan of care.       Precautions: N/A    Date 8/3 7/20 7/25 7/27 8/1 Reasses   FOTO  63      Manuals        Passive knee ext 5 min ZJ 5 min 5 min JF 5 min    Passive knee flexion Quad stretch off table    5 min   Gastroc stretch ham and quad Jf 4x  30 x JF 30" 4x & ham JF  4 x 30 "  JF  10 min    Neuro Re-Ed        SLR 20 x 20x 20 x 20 x    SLB Foam 20 " 5 x 5x 10" 5 x 10 "  5 " 10 x 30" 5x   Tandem stance 10 " 3 x each 5x 10" ea 5 x 10 " ea 5 " 10 x    Tandem walking 5 laps airex 5 laps airex 5 laps  airex  5 laps 5 laps                   Ther Ex        Heel slide  20x 5" 20 x 5 "  20 x 5 "    Towel crush  20x 5" 20 x  5 "     LAQ 10 #  20 x 3 # 20 x  5 " 1 # 15 X  3 # 20 x    Leg press 40 #  30 x  Squats  20 x Squats 20 x 40# 20x   Bike for ROM and strengthening  L 6 5 min L 5  8min L 5 8 min L5 8 min   Standing abd   ext  2#  20 x 20x ea  2 #   20 x each    Bike for strengthening L 5 8 min    L5 8 min   Hip machine abd 15 #  20 x both       squats 20 x       SHC 35 # x  2 #  Machine ham curls 25# 20 x Machine ham curls 30  # 20 x Hamstring machine 25 #  20 x 35# 3x10   Ther Activity        Step downs 6 " 20 x       Step Ups 8 "  30 x 30 x 6 "  20x  6 "  20 x 6 "  8" 20x   Bridge w/ LAQ 20x     15x   Gait Training        SPC  8 min              Modalities

## 2023-08-08 ENCOUNTER — OFFICE VISIT (OUTPATIENT)
Dept: PHYSICAL THERAPY | Facility: CLINIC | Age: 63
End: 2023-08-08
Payer: COMMERCIAL

## 2023-08-08 DIAGNOSIS — M25.561 ACUTE PAIN OF RIGHT KNEE: Primary | ICD-10-CM

## 2023-08-08 DIAGNOSIS — Z96.651 PRESENCE OF RIGHT ARTIFICIAL KNEE JOINT: ICD-10-CM

## 2023-08-08 PROCEDURE — 97110 THERAPEUTIC EXERCISES: CPT

## 2023-08-08 PROCEDURE — 97140 MANUAL THERAPY 1/> REGIONS: CPT

## 2023-08-08 PROCEDURE — 97530 THERAPEUTIC ACTIVITIES: CPT

## 2023-08-08 NOTE — PROGRESS NOTES
Daily Note     Today's date: 2023  Patient name: John Ray  : 1960  MRN: [id number]  Referring provider: Zia Jerome MD  Dx:   Encounter Diagnosis     ICD-10-CM    1. Acute pain of right knee  M25.561       2. Presence of right artificial knee joint  Z96.651           Start Time: 0200  Stop Time: 0305  Total time in clinic (min): 65 minutes    Subjective: I have tightness over the front of my knee. I have most tightness over the outside of my right knee. My knee still wakes me up at night. Objective: See treatment diary below      Assessment: Tolerated treatment well. Patient would benefit from continued PT  Pt completes all exercises with some mild pain over her right knee with tightness over her anterior knee . She still has some mild swelling and tightness over her lateral right knee. We worked on her motion and strength., Her knee ext is close to full ext. She still reports pain with passive knee ext. We ended with a cold pack to her right knee post for 10 min.,       Plan: Continue per plan of care.       Precautions: N/A    Date 8/3 8/8 7/25 7/27 8/1 Reasses   FOTO        Manuals        Passive knee ext 5 min JF 5 min 5 min JF 5 min    Passive knee flexion Quad stretch off table    5 min   Gastroc stretch ham and quad Jf 4x  30 x JF 30" 4x & ham JF  4 x 30 "  JF  10 min    Neuro Re-Ed        SLR 20 x 20x 20 x 20 x    SLB Foam 20 " 5 x 5x 10" foam 5 x 10 "  5 " 10 x 30" 5x   Tandem stance 10 " 3 x each 5x 10" ea 5 x 10 " ea 5 " 10 x    Tandem walking 5 laps airex 5 laps airex 5 laps  airex  5 laps 5 laps                   Ther Ex        Heel slide  20x 5" 20 x 5 "  20 x 5 "    Towel crush  20x 5" 20 x  5 "     LAQ 10 #  20 x 15 #  30 x   leg ext 1 # 15 X  3 # 20 x    Leg press 40 #  30 x 40 # 30 x Squats  20 x Squats 20 x 40# 20x   Bike for ROM and strengthening  L 6 5 min L 5  8min L 5 8 min L5 8 min   Standing abd   ext  2#  20 x 20x ea  2 #   20 x each    Bike for strengthening L 5 8 min L 5  8 min   L5 8 min   Hip machine abd 15 #  20 x both 15 #  20 x both      squats 20 x 20 x      SHC 35 # x  2 #  Machine ham curls 35 # 30 x Machine ham curls 30  # 20 x Hamstring machine 25 #  20 x 35# 3x10   Ther Activity        Step downs 6 " 20 x 6 " 20 x      Step Ups 8 "  30 x 30 x 8 "  20x  6 "  20 x 6 "  8" 20x   Bridge w/ LAQ 20x  20 x   15x   Gait Training        SPC  8 min              Modalities

## 2023-08-10 ENCOUNTER — OFFICE VISIT (OUTPATIENT)
Dept: PHYSICAL THERAPY | Facility: CLINIC | Age: 63
End: 2023-08-10
Payer: COMMERCIAL

## 2023-08-10 DIAGNOSIS — Z96.651 PRESENCE OF RIGHT ARTIFICIAL KNEE JOINT: ICD-10-CM

## 2023-08-10 DIAGNOSIS — M25.561 ACUTE PAIN OF RIGHT KNEE: Primary | ICD-10-CM

## 2023-08-10 PROCEDURE — 97112 NEUROMUSCULAR REEDUCATION: CPT

## 2023-08-10 PROCEDURE — 97530 THERAPEUTIC ACTIVITIES: CPT

## 2023-08-10 PROCEDURE — 97110 THERAPEUTIC EXERCISES: CPT

## 2023-08-10 NOTE — PROGRESS NOTES
Daily Note     Today's date: 8/10/2023  Patient name: Whit Urbano  : 1960  MRN: [id number]  Referring provider: Kaylie Ledbetter MD  Dx:   Encounter Diagnosis     ICD-10-CM    1. Acute pain of right knee  M25.561       2. Presence of right artificial knee joint  Z96.651           Start Time: 1400  Stop Time: 1500  Total time in clinic (min): 60 minutes    Subjective: Pt notes continued AM stiffness. She sees the MD tomorrow. Objective: See treatment diary below. Pt is 8 weeks post-op today. Assessment: Tolerated treatment well. Patient exhibited good technique with therapeutic exercises      Plan: Continue per plan of care.       Precautions: N/A    Date 8/3 8/8 8/10 7/27 8/1 Reasses   FOTO        Manuals        Passive knee ext 5 min JF 5 min ds 5 min    Passive knee flexion Quad stretch off table  ds  5 min   Gastroc/ ham / quad stretch Jf 4x  30 x JF 30" 4x & ham ds  JF  10 min    Neuro Re-Ed        SLR 20 x 20x 1# 20x 20 x    SLB-foam Foam 20 " 5 x 5x 10" foam 3x 15" 5 " 10 x 30" 5x   Tandem stance-foam 10 " 3 x each 5x 10" ea 5x 10" 5 " 10 x    Tandem walk-foam 5 laps airex 5 laps airex 5 laps  F/B 5 laps 5 laps   Clamshell   10x 5"             Ther Ex        Heel slide  20x 5" np 20 x 5 "    Towel crush  20x 5" 20x 5"     Leg Ext 10 #  20 x 15 #  30 x   l 15# 2/15 3 # 20 x    Leg press 40 #  30 x 40 # 30 x 40# 2/15  20 x 40# 20x   Bike for strengthening L 5 8 min L 5  8 min 8m L5  L5 8 min   Hip machine abd 15 #  20 x both 15 #  20 x both 15# 20x ea     Squats 20 x 20 x 20x     Hamstring curls 35 # x  2 #    35 # 30 x  35# 2/15  25 #  20 x 35# 3x10   Ther Activity        Step downs 6 " 20 x 6 " 20 x 6" 20     Step Ups 8 "  30 x 30 x 8 "  8" 30x 20 x 6 "  8" 20x   Bridge w/ LAQ 20x  20 x 20x   15x   Gait Training                Modalities

## 2023-08-15 ENCOUNTER — OFFICE VISIT (OUTPATIENT)
Dept: PHYSICAL THERAPY | Facility: CLINIC | Age: 63
End: 2023-08-15
Payer: COMMERCIAL

## 2023-08-15 DIAGNOSIS — Z96.651 PRESENCE OF RIGHT ARTIFICIAL KNEE JOINT: Primary | ICD-10-CM

## 2023-08-15 DIAGNOSIS — M25.561 ACUTE PAIN OF RIGHT KNEE: ICD-10-CM

## 2023-08-15 PROCEDURE — 97530 THERAPEUTIC ACTIVITIES: CPT

## 2023-08-15 PROCEDURE — 97110 THERAPEUTIC EXERCISES: CPT

## 2023-08-15 PROCEDURE — 97112 NEUROMUSCULAR REEDUCATION: CPT

## 2023-08-15 NOTE — PROGRESS NOTES
Daily Note     Today's date: 8/15/2023  Patient name: Viri Moran  : 1960  MRN: [id number]  Referring provider: Irvin Figueroa MD  Dx:   Encounter Diagnosis     ICD-10-CM    1. Presence of right artificial knee joint  Z96.651       2. Acute pain of right knee  M25.561           Start Time: 1400  Stop Time: 0308  Total time in clinic (min): 788 minutes    Subjective: I saw the Dr and he wants me to continue with therapy. He was happy with my progress. Objective: See treatment diary below      Assessment: Tolerated treatment well. Patient would benefit from continued PT  Pt still has some mild swelling noted in her right knee. She has almost all of her active knee ext today she still has some tightness in her knee with flexion and ext. We are working on her motion as well as her  Strength. She still reports some fatigue at times with her exercises. Pt reports having some pain with passive knee ext today. We ended with ice for 10 min post.       Plan: Continue per plan of care.       Precautions: N/A    Date 8/3 8/8 8/10 8/15 8/1 Reasses   FOTO        Manuals        Passive knee ext 5 min JF 5 min ds 5 min    Passive knee flexion Quad stretch off table  ds  5 min   Gastroc/ ham / quad stretch Jf 4x  30 x JF 30" 4x & ham ds  JF   5 min    Neuro Re-Ed        SLR 20 x 20x 1# 20x     SLB-foam Foam 20 " 5 x 5x 10" foam 3x 15" 5 " 10 x 30" 5x   Tandem stance-foam 10 " 3 x each 5x 10" ea 5x 10" 5 " 10 x    Tandem walk-foam 5 laps airex 5 laps airex 5 laps  F/B 5 laps 5 laps   Clamshell   10x 5" 10 x 5 "             Ther Ex        Heel slide  20x 5" np 20 x 5 "    Towel crush  20x 5" 20x 5"     Leg Ext 10 #  20 x 15 #  30 x   l 15# 2/15 15  # 20 x    Leg press 40 #  30 x 40 # 30 x 40# 2/15  40 # 2 x 20  40# 20x   Bike for strengthening L 5 8 min L 5  8 min 8m L5 8 min bike L 5 L5 8 min   Hip machine abd 15 #  20 x both 15 #  20 x both 15# 20x ea 15 # 20 x     Squats 20 x 20 x 20x 20 x    Hamstring curls 35 # x  2 #    35 # 30 x  35# 2/15  35  #  20 x 35# 3x10   Ther Activity        Step downs 6 " 20 x 6 " 20 x 6" 20 6 " 20 x    Lateral step up    6 " 15 x     Step Ups 8 "  30 x 30 x 8 "  8" 30x 8 ' 30 x 8" 20x   Bridge w/ LAQ 20x  20 x 20x   15x   Gait Training                Modalities

## 2023-08-17 ENCOUNTER — OFFICE VISIT (OUTPATIENT)
Dept: PHYSICAL THERAPY | Facility: CLINIC | Age: 63
End: 2023-08-17
Payer: COMMERCIAL

## 2023-08-17 DIAGNOSIS — Z96.651 PRESENCE OF RIGHT ARTIFICIAL KNEE JOINT: Primary | ICD-10-CM

## 2023-08-17 PROCEDURE — 97110 THERAPEUTIC EXERCISES: CPT

## 2023-08-17 PROCEDURE — 97530 THERAPEUTIC ACTIVITIES: CPT

## 2023-08-17 PROCEDURE — 97112 NEUROMUSCULAR REEDUCATION: CPT

## 2023-08-17 NOTE — PROGRESS NOTES
Daily Note     Today's date: 2023  Patient name: Jeremias Vanegas  : 1960  MRN: [id number]  Referring provider: Lester Carballo MD  Dx:   Encounter Diagnosis     ICD-10-CM    1. Presence of right artificial knee joint  Z96.651           Start Time: 1400  Stop Time: 1505  Total time in clinic (min): 65 minutes    Subjective: My knee is a bit stiff , but, feeling pretty good. I had some pain behind my knee after the last visit. Objective: See treatment diary below      Assessment: Tolerated treatment well. Patient would benefit from continued PT pt completes her program today with some soreness over her posterior right knee and some over the front of her knee. She still gets some pain with passive knee flexion and ext. She had some mild swelling today. She was still sensitive over her distal incision site. We ended with a cold pack to her knee for 10 min. Plan: Continue per plan of care.       Precautions: N/A    Date 8/3 8/8 8/10 8/15 8/17   FOTO        Manuals        Passive knee ext 5 min JF 5 min ds 5 min 5 min   Passive knee flexion Quad stretch off table  ds  5 min   Gastroc/ ham / quad stretch Jf 4x  30 x JF 30" 4x & ham ds  JF   5 min JF 5 min   Neuro Re-Ed        SLR 20 x 20x 1# 20x     SLB-foam Foam 20 " 5 x 5x 10" foam 3x 15" 5 " 10 x 10 " 5 x   Tandem stance-foam 10 " 3 x each 5x 10" ea 5x 10" 5 " 10 x 10" 5 x   Tandem walk-foam 5 laps airex 5 laps airex 5 laps  F/B 5 laps 5 laps   Clamshell   10x 5" 10 x 5 "  10 x 5 "           Ther Ex        Heel slide  20x 5" np 20 x 5 " 20 x 5 "    Towel crush  20x 5" 20x 5"     Leg Ext 10 #  20 x 15 #  30 x   l 15# 2/15 15  # 20 x 15 #  2 x 15    Leg press 40 #  30 x 40 # 30 x 40# 2/15  40 # 2 x 20  40# 2 x 20    Bike for strengthening L 5 8 min L 5  8 min 8m L5 8 min bike L 5 L5 8 min   Hip machine abd 15 #  20 x both 15 #  20 x both 15# 20x ea 15 # 20 x  15 # 20 x   Squats 20 x 20 x 20x 20 x 20 x   Hamstring curls 35 # x  2 #    35 # 30 x 35# 2/15  35  #  20 x 35# 3x10   Ther Activity        Step downs 6 " 20 x 6 " 20 x 6" 20 6 " 20 x 6 " 20 x   Lateral step up 6" 20 x   6 " 15 x  6 " 15 x   Step Ups 8 "  30 x 30 x 8 "  8" 30x 8 ' 30 x 8" 20x   Bridge w/ LAQ 20x  20 x 20x   15x   Gait Training                Modalities

## 2023-08-21 ENCOUNTER — OFFICE VISIT (OUTPATIENT)
Dept: PHYSICAL THERAPY | Facility: CLINIC | Age: 63
End: 2023-08-21
Payer: COMMERCIAL

## 2023-08-21 DIAGNOSIS — Z96.651 PRESENCE OF RIGHT ARTIFICIAL KNEE JOINT: Primary | ICD-10-CM

## 2023-08-21 PROCEDURE — 97112 NEUROMUSCULAR REEDUCATION: CPT

## 2023-08-21 PROCEDURE — 97110 THERAPEUTIC EXERCISES: CPT

## 2023-08-21 NOTE — PROGRESS NOTES
Daily Note     Today's date: 2023  Patient name: Cachorro Cheung  : 1960  MRN: [id number]  Referring provider: Farida Greco MD  Dx:   Encounter Diagnosis     ICD-10-CM    1. Presence of right artificial knee joint  Z96.651           Start Time: 0800  Stop Time: 0900  Total time in clinic (min): 60 minutes    Subjective: I mostly have stiffness in the morning. My knee is getting better. Objective: See treatment diary below      Assessment: Tolerated treatment well. Patient would benefit from continued PT  Pt reports having some stiffness during her program today., She still has some weakness with step downs and lateral step ups. She reports moderate pain with passive knee ext today. We will continue to work on her motion as well as her strength. She was  over her distal scar site. We ended with a cold pack at the end for 10 min to her right knee. Plan: Continue per plan of care.       Precautions: N/A    Date 8/21 8/8 8/10 8/15 8/17   FOTO        Manuals        Passive knee ext 5 min JF 5 min ds 5 min 5 min   Passive knee flexion Quad stretch off table  ds  5 min   Gastroc/ ham / quad stretch Jf 4x  30 x JF 30" 4x & ham ds  JF   5 min JF 5 min   Neuro Re-Ed        SLR 20 x 20x 1# 20x     SLB-foam Foam 20 " 5 x 5x 10" foam 3x 15" 5 " 10 x 10 " 5 x   Tandem stance-foam 10 " 3 x each 5x 10" ea 5x 10" 5 " 10 x 10" 5 x   Tandem walk-foam 5 laps airex 5 laps airex 5 laps  F/B 5 laps 5 laps   Clamshell   10x 5" 10 x 5 "  10 x 5 "           Ther Ex        Heel slide  20x 5" np 20 x 5 " 20 x 5 "    Towel crush  20x 5" 20x 5"     Leg Ext 15 #  20 x 15 #  30 x   l 15# 2/15 15  # 20 x 15 #  2 x 15    Leg press 40 #  2x 20 40 # 30 x 40# 2/15  40 # 2 x 20  40# 2 x 20    Bike for strengthening L 5 8 min L 5  8 min 8m L5 8 min bike L 5 L5 8 min   Hip machine abd 15 #  20 x both 15 #  20 x both 15# 20x ea 15 # 20 x  15 # 20 x   Squats 20 x 20 x 20x 20 x 20 x   Hamstring curls 35 # x  2 x20 35 # 30 x  35# 2/15  35  #  20 x 35# 3x10   Ther Activity        Step downs 6 " 20 x 6 " 20 x 6" 20 6 " 20 x 6 " 20 x   Lateral step up 6" 20 x   6 " 15 x  6 " 15 x   Step Ups 8 "  30 x 30 x 8 "  8" 30x 8 ' 30 x 8" 20x   Bridge w/ LAQ 20x  20 x 20x   15x   Gait Training                Modalities

## 2023-08-22 ENCOUNTER — APPOINTMENT (OUTPATIENT)
Dept: PHYSICAL THERAPY | Facility: CLINIC | Age: 63
End: 2023-08-22
Payer: COMMERCIAL

## 2023-08-24 ENCOUNTER — OFFICE VISIT (OUTPATIENT)
Dept: PHYSICAL THERAPY | Facility: CLINIC | Age: 63
End: 2023-08-24
Payer: COMMERCIAL

## 2023-08-24 DIAGNOSIS — M25.561 ACUTE PAIN OF RIGHT KNEE: Primary | ICD-10-CM

## 2023-08-24 DIAGNOSIS — Z96.651 PRESENCE OF RIGHT ARTIFICIAL KNEE JOINT: ICD-10-CM

## 2023-08-24 PROCEDURE — 97110 THERAPEUTIC EXERCISES: CPT

## 2023-08-24 PROCEDURE — 97530 THERAPEUTIC ACTIVITIES: CPT

## 2023-08-24 PROCEDURE — 97112 NEUROMUSCULAR REEDUCATION: CPT

## 2023-08-28 ENCOUNTER — APPOINTMENT (OUTPATIENT)
Dept: PHYSICAL THERAPY | Facility: CLINIC | Age: 63
End: 2023-08-28
Payer: COMMERCIAL

## 2023-08-29 ENCOUNTER — OFFICE VISIT (OUTPATIENT)
Dept: PHYSICAL THERAPY | Facility: CLINIC | Age: 63
End: 2023-08-29
Payer: COMMERCIAL

## 2023-08-29 DIAGNOSIS — M25.561 ACUTE PAIN OF RIGHT KNEE: Primary | ICD-10-CM

## 2023-08-29 PROCEDURE — 97110 THERAPEUTIC EXERCISES: CPT

## 2023-08-29 PROCEDURE — 97112 NEUROMUSCULAR REEDUCATION: CPT

## 2023-08-29 PROCEDURE — 97530 THERAPEUTIC ACTIVITIES: CPT

## 2023-08-29 NOTE — PROGRESS NOTES
Daily Note     Today's date: 2023  Patient name: Lizet Mistry  : 1960  MRN: [id number]  Referring provider: Mariam Elena MD  Dx:   Encounter Diagnosis     ICD-10-CM    1. Acute pain of right knee  M25.561           Start Time: 1400  Stop Time: 1505  Total time in clinic (min): 65 minutes    Subjective: I got sore in both legs after my last visit. It only lasted about a day.,       Objective: See treatment diary below      Assessment: Tolerated treatment well. Patient would benefit from continued PT  Pt completes her program with some mild fatigue in her leg,. She reports having very little pain over her incision site today,  She is improving with balance drills and step ups to the front and lateral step ups. We will continue to  work on her motion and strength . We ended with a cold pack post for 10 min,. Plan: Continue per plan of care.       Precautions: N/A    Date 8/21 8/24 8/29 8/15 8/17   FOTO        Manuals        Passive knee ext 5 min JF 5 min JF 5 min 5 min   Passive knee flexion Quad stretch off table Quad  5 x  JF  5 min   Gastroc/ ham / quad stretch Jf 4x  30 x JF 30" 4x & ham JF  JF   5 min JF 5 min   Neuro Re-Ed        SLR 20 x 20x  2 #  1# 20x     SLB-foam Foam 20 " 5 x 5x 10" foam 3x 15" 5 " 10 x 10 " 5 x   Tandem stance-foam 10 " 3 x each 5x 10" ea 5x 10" 5 " 10 x 10" 5 x   Tandem walk-foam 5 laps airex 5 laps airex 5 laps  F/B 5 laps 5 laps   Clamshell  10 X 5 "  10x 5" 10 x 5 "  10 x 5 "           Ther Ex        Heel slide  20x 5" np 20 x 5 " 20 x 5 "    Towel crush  20x 5" 20x 5"     Leg Ext 15 #  20 x 15 #   2 x 20  15# 2/15 15  # 20 x 15 #  2 x 15    Leg press 40 #  2x 20 40 #  2 x 20  40# 2/20   40 # 2 x 20  40# 2 x 20    Bike for strengthening L 5 8 min L 5  8 min 8m L5 8 min bike L 5 L5 8 min   Hip machine abd 15 #  20 x both 15 #  20 x both 15# 20x ea 15 # 20 x  15 # 20 x   Squats 20 x 20 x 20x 20 x 20 x   Hamstring curls 35 # x  2 x20   35 #  2 x 20   40 #  2/15 35  #  20 x 35# 3x10   Ther Activity        Step downs 6 " 20 x 6 " 20 x 6" 20 6 " 20 x 6 " 20 x   Lateral step up 6" 20 x 6 " 20x 6" 20 x 6 " 15 x  6 " 15 x   Step Ups 8 "  30 x 30 x 8 "  8" 30x 8 ' 30 x 8" 20x   Bridge w/ LAQ 20x  20 x 20x   15x   Gait Training                Modalities

## 2023-08-31 ENCOUNTER — OFFICE VISIT (OUTPATIENT)
Dept: PHYSICAL THERAPY | Facility: CLINIC | Age: 63
End: 2023-08-31
Payer: COMMERCIAL

## 2023-08-31 DIAGNOSIS — Z96.651 PRESENCE OF RIGHT ARTIFICIAL KNEE JOINT: Primary | ICD-10-CM

## 2023-08-31 DIAGNOSIS — M25.561 ACUTE PAIN OF RIGHT KNEE: ICD-10-CM

## 2023-08-31 PROCEDURE — 97530 THERAPEUTIC ACTIVITIES: CPT

## 2023-08-31 PROCEDURE — 97110 THERAPEUTIC EXERCISES: CPT

## 2023-08-31 PROCEDURE — 97112 NEUROMUSCULAR REEDUCATION: CPT

## 2023-08-31 NOTE — PROGRESS NOTES
Daily Note     Today's date: 2023  Patient name: Mayela Coates  : 1960  MRN: [id number]  Referring provider: Kate Gonzalez MD  Dx:   Encounter Diagnosis     ICD-10-CM    1. Presence of right artificial knee joint  Z96.651       2. Acute pain of right knee  M25.561           Start Time: 1400  Stop Time: 1500  Total time in clinic (min): 60 minutes    Subjective: I am feeling good today. The knee has been feeling better,       Objective: See treatment diary below      Assessment: Tolerated treatment well. Patient would benefit from continued PT  We worked on her strength and motion today,. She is doing better and is having less pain with passive flexion and ext. She still has some pain over her distal incision site. She feels it has improved a lot . We ended with a cold pack to her right knee post for 10 min. Plan: Continue per plan of care.       Precautions: N/A    Date    FOTO        Manuals        Passive knee ext 5 min JF 5 min JF 5 min 5 min   Passive knee flexion Quad stretch off table Quad  5 x  JF  5 min   Gastroc/ ham / quad stretch Jf 4x  30 x JF 30" 4x & ham JF  JF   5 min JF 5 min   Neuro Re-Ed        SLR 20 x 20x  2 #  1# 20x     SLB-foam Foam 20 " 5 x 5x 10" foam 3x 15" 5 " 10 x 10 " 5 x   Tandem stance-foam 10 " 3 x each 5x 10" ea 5x 10" 5 " 10 x 10" 5 x   Tandem walk-foam 5 laps airex 5 laps airex 5 laps  F/B 5 laps 5 laps   Clamshell  10 X 5 "  10x 5" 10 x 5 "  10 x 5 "           Ther Ex        Heel slide  20x 5" np 20 x 5 " 20 x 5 "    Towel crush  20x 5" 20x 5"     Leg Ext 15 #  20 x 15 #   2 x 20  15# 2/15 20   # 20 x 15 #  2 x 15    Leg press 40 #  2x 20 40 #  2 x 20  40# 2/20   40 # 2 x 20  40# 2 x 20    Bike for strengthening L 5 8 min L 5  8 min 8m L5 8 min bike L 5 L5 8 min   Hip machine abd / ext 15 #  20 x both 15 #  20 x both  15 # 20x ea 30 # 20 x  15 # 20 x   Squats 20 x 20 x 20x 20 x 20 x   Hamstring curls 35 # x  2 x20   35 #  2 x 20   40 #  2/15   40  #  2 x 15  35# 3x10   Ther Activity        Step downs 6 " 20 x 6 " 20 x 6" 20 6 " 20 x 6 " 20 x   Lateral step up 6" 20 x 6 " 20x 6" 20 x 6 " 15 x  6 " 15 x   Step Ups 8 "  30 x 30 x 8 "  8" 30x 8 ' 30 x 8" 20x   Bridge w/ LAQ 20x  20 x 20x   15x   Gait Training                Modalities

## 2023-09-05 ENCOUNTER — OFFICE VISIT (OUTPATIENT)
Dept: PHYSICAL THERAPY | Facility: CLINIC | Age: 63
End: 2023-09-05
Payer: COMMERCIAL

## 2023-09-05 DIAGNOSIS — Z96.651 PRESENCE OF RIGHT ARTIFICIAL KNEE JOINT: ICD-10-CM

## 2023-09-05 DIAGNOSIS — M25.561 ACUTE PAIN OF RIGHT KNEE: Primary | ICD-10-CM

## 2023-09-05 PROCEDURE — 97112 NEUROMUSCULAR REEDUCATION: CPT

## 2023-09-05 PROCEDURE — 97530 THERAPEUTIC ACTIVITIES: CPT

## 2023-09-05 PROCEDURE — 97110 THERAPEUTIC EXERCISES: CPT

## 2023-09-05 NOTE — PROGRESS NOTES
Daily Note     Today's date: 2023  Patient name: Sandra Lee  : 1960  MRN: [id number]  Referring provider: Caprice Evangelista MD  Dx:   Encounter Diagnosis     ICD-10-CM    1. Acute pain of right knee  M25.561       2. Presence of right artificial knee joint  Z96.651           Start Time: 1400  Stop Time: 1505  Total time in clinic (min): 65 minutes    Subjective: My knee is doing good. I have stiffness in the morning , but, its getting less. Objective: See treatment diary below      Assessment: Tolerated treatment well. Patient would benefit from continued PT  Pt did well today and only reports some mild soreness and stiffness. She is doing better with all exercises and shows increased endurance . We will continue to work on her motion and strength. She had some mild pitting edema in her lower right leg today. Pt reports that she is on her feet more over all. We ended in ice today for 10 min to her right knee. Plan: Continue per plan of care.       Precautions: N/A    Date    FOTO        Manuals        Passive knee ext 5 min JF 5 min JF 5 min 3 min   Passive knee flexion Quad stretch off table Quad  5 x  JF  5 min   Gastroc/ ham / quad stretch Jf 4x  30 x JF 30" 4x & ham JF  JF   5 min JF 5 min   Neuro Re-Ed        SLR 20 x 20x  2 #  1# 20x     SLB-foam Foam 20 " 5 x 5x 10" foam 3x 15" 5 " 10 x 10 " 5 x   Tandem stance-foam 10 " 3 x each 5x 10" ea 5x 10" 5 " 10 x 10" 5 x   Tandem walk-foam 5 laps airex 5 laps airex 5 laps  F/B 5 laps 5 laps   Clamshell  10 X 5 "  10x 5" 10 x 5 "  10 x 5 "           Ther Ex        Heel slide  20x 5" np 20 x 5 "    Towel crush  20x 5" 20x 5"     Leg Ext 15 #  20 x 15 #   2 x 20  15# 2/15 20   # 20 x 15 #  2 x 15    Leg press 40 #  2x 20 40 #  2 x 20  40# 2/20   40 # 2 x 20  40# 2 x 20    Bike for strengthening L 5 8 min L 5  8 min 8m L5 8 min bike L 5 L5 8 min   Hip machine abd / ext 15 #  20 x both 15 #  20 x both  15 # 20x ea 30 # 20 x  30 #  30 x   Squats 20 x 20 x 20x 20 x 20 x   Hamstring curls 35 # x  2 x20   35 #  2 x 20   40 #  2/15   40  #  2 x 15   40 # 3x10   Ther Activity        Step downs 6 " 20 x 6 " 20 x 6" 20 6 " 20 x 6 " 20 x   Lateral step up 6" 20 x 6 " 20x 6" 20 x 6 " 15 x  6 " 15 x   Step Ups 8 "  30 x 30 x 8 "  8" 30x 8 ' 30 x 8" 20x   Bridge w/ LAQ 20x  20 x 20x   15x   Gait Training                Modalities

## 2023-09-07 ENCOUNTER — OFFICE VISIT (OUTPATIENT)
Dept: PHYSICAL THERAPY | Facility: CLINIC | Age: 63
End: 2023-09-07
Payer: COMMERCIAL

## 2023-09-07 DIAGNOSIS — Z96.651 PRESENCE OF RIGHT ARTIFICIAL KNEE JOINT: Primary | ICD-10-CM

## 2023-09-07 PROCEDURE — 97112 NEUROMUSCULAR REEDUCATION: CPT

## 2023-09-07 PROCEDURE — 97110 THERAPEUTIC EXERCISES: CPT

## 2023-09-07 PROCEDURE — 97530 THERAPEUTIC ACTIVITIES: CPT

## 2023-09-07 NOTE — PROGRESS NOTES
Daily Note     Today's date: 2023  Patient name: Claritza Aldridge  : 1960  MRN: [id number]  Referring provider: Juventino Ormond, MD  Dx:   Encounter Diagnosis     ICD-10-CM    1. Presence of right artificial knee joint  Z96.651           Start Time: 1400  Stop Time: 1500  Total time in clinic (min): 60 minutes    Subjective: My knee is doing pretty good. I feel its getting stronger. Objective: See treatment diary below      Assessment: Tolerated treatment well. Patient would benefit from continued PT  Pt did well today with her exercises and feels improved strength. She still has some tightness noted in her knee . She reports that she is able to do stairs better. Pt states that she is going down the stairs now one at a time with all stairs. Pt has improved balance as well. We ended with ice post for 8 min.,   Plan: Continue per plan of care.       Precautions: N/A    Date    FOTO        Manuals        Passive knee ext 5 min JF 5 min JF 5 min 3 min   Passive knee flexion Quad stretch off table Quad  5 x  JF  5 min   Gastroc/ ham / quad stretch Jf 4x  30 x JF 30" 4x & ham JF  JF   5 min JF 5 min   Neuro Re-Ed        SLR 20 x 20x  2 #  1# 20x     SLB-foam Foam 20 " 5 x 5x 10" foam 3x 15" 5 " 10 x 10 " 5 x   Tandem stance-foam 10 " 3 x each 5x 10" ea 5x 10" 5 " 10 x 10" 5 x   Tandem walk-foam 5 laps airex 5 laps airex 5 laps  F/B 5 laps 5 laps   Clamshell 10 x 5 "  10 X 5 "  10x 5" 10 x 5 "  10 x 5 "           Ther Ex        Heel slide  20x 5" np 20 x 5 "    Towel crush  20x 5" 20x 5"     Leg Ext 20  #  20 x 15 #   2 x 20  15# 2/15 20   # 20 x 15 #  2 x 15    Leg press 50  #  2x 20 40 #  2 x 20  40# 2/20   40 # 2 x 20  40# 2 x 20    Bike for strengthening L 5 8 min L 5  8 min 8m L5 8 min bike L 5 L5 8 min   Hip machine abd / ext 15 #  20 x both 15 #  20 x both  15 # 20x ea 30 # 20 x  30 #  30 x   Squats 20 x 20 x 20x 20 x 20 x   Hamstring curls 40  # x  2 x20   35 #  2 x 20   40 #  2/15   40  #  2 x 15   40 # 3x10   Ther Activity o       Step downs 6 " 20 x 6 " 20 x 6" 20 6 " 20 x 6 " 20 x   Lateral step up 6" 20 x 6 " 20x 6" 20 x 6 " 15 x  6 " 15 x   Step Ups 8 "  30 x 30 x 8 "  8" 30x 8 ' 30 x 8" 20x   Bridge w/ LAQ 20x  20 x 20x   15x   Gait Training                Modalities

## 2023-09-13 ENCOUNTER — EVALUATION (OUTPATIENT)
Dept: PHYSICAL THERAPY | Facility: CLINIC | Age: 63
End: 2023-09-13
Payer: COMMERCIAL

## 2023-09-13 DIAGNOSIS — M25.561 ACUTE PAIN OF RIGHT KNEE: ICD-10-CM

## 2023-09-13 DIAGNOSIS — Z96.651 PRESENCE OF RIGHT ARTIFICIAL KNEE JOINT: Primary | ICD-10-CM

## 2023-09-13 PROCEDURE — 97110 THERAPEUTIC EXERCISES: CPT | Performed by: PHYSICAL THERAPIST

## 2023-09-13 PROCEDURE — 97530 THERAPEUTIC ACTIVITIES: CPT | Performed by: PHYSICAL THERAPIST

## 2023-09-13 PROCEDURE — 97112 NEUROMUSCULAR REEDUCATION: CPT | Performed by: PHYSICAL THERAPIST

## 2023-09-13 NOTE — PROGRESS NOTES
PT Re-Evaluation     Today's date: 2023  Patient name: Art Reyna  : 1960  MRN: [id number]  Referring provider: Ray Lopez MD  Dx:   Encounter Diagnosis     ICD-10-CM    1. Presence of right artificial knee joint  Z96.651       2. Acute pain of right knee  M25.561           Start Time: 1400  Stop Time: 1500  Total time in clinic (min): 60 minutes    Assessment  Assessment details: Patient has attended 23 physical therapy visits to date. She is demonstrating good improvements with both mobility and strength of the R knee. She has minimal restriction to functional mobility of the R knee and continues with mild strength deficits which she feels is only limiting her ability to descend stairs. She did well with progression of strengthening interventions today. She has great patellar mobility noted during assessment today with no restriction noted to the incision site either. Impairments: lacks appropriate home exercise program    Symptom irritability: lowUnderstanding of Dx/Px/POC: good   Prognosis: good    Goals  STGs    Patient will increase right knee flexion AROM from 91 to 110 degrees in 3 weeks to be able to use stairs with a step through pattern. - MET  Patient will increase right knee extension AROM from -8 to -3 degrees in 3 weeks to be able to tolerate normal ADL's. - MET  Patient will increase right knee flexion strength from 4 to 4+ by discharge in 3 weeks to be able to tolerate normal ADL's.- MET    LTGs    Patient will increase FOTO score from 41 to 77 by discharge to be able to complete ADL's. - MET  Patient will increase right knee extension strength from 4 to 5 by discharge to be able to tolerate normal ADL's. - MET  Patient will decrease R knee edema from 39.8 cm to 34 cm by discharge to be able to complete ADL's as normal. - MET    Plan  Plan details: Patient will have deficits addressed that were found in the evaluation today.  Patient will benefit from PT to address R knee ROM and strength to be able to get back to walking their normal mileage and their normal ADL's. Patient informed that from this point forward, to ensure adherence to the aforementioned plan of care, all or some of the treatment may be performed and carried out by a Physical Therapy Assistant (PTA) with supervision from a licensed Physical Therapist (PT) in accordance with [address] [place] [address]. From this point forward, all or some treatments may be carried out by a Physical Therapy Student (SPT) while under direct supervision from a licensed Physical Therapist (PT). Planned modality interventions: cryotherapy  Planned therapy interventions: manual therapy, balance/weight bearing training, neuromuscular re-education, strengthening, stretching, therapeutic activities, therapeutic exercise and home exercise program  Duration in visits: 1  Plan of Care beginning date: 9/13/2023  Plan of Care expiration date: 9/20/2023  Treatment plan discussed with: patient        Subjective Evaluation    History of Present Illness  Mechanism of injury: surgery  Mechanism of injury: Patient presents to outpatient clinic post op R TKA. Patient reports doing well since the operation on 6/15/23 and is currently having difficulty managing the post op swelling. Patient reports they started exercises provided by their physician before the surgery and they have been continuing them. Patient expresses walking upstairs and standing for more than 20 minutes at a time has been difficult for them. Patient disclosed prior medical history of diabetes and thyroid problems. Update 8/1/2023:  Patient reports that her knee is doing well. She feels that she is no longer relying on the cane for assistance. There is improved tolerance for walking and standing activities at home. She felt that the scar massage helped with her mobility and pain last session.  She feels that standing in one spot for a long period of time will still be difficult for her to tolerate. Update 2023:  Patient reports that she only has some mild stiffness in the knee when she first wakes up and when she is going down stairs. She notes that her knee does feel a little weak when she is going down the stairs but she is getting up and down from chairs without an issue. Quality of life: good    Patient Goals  Patient goals for therapy: decreased edema, decreased pain and increased strength  Patient goal: Be able to sleep at night without pain  Pain  Current pain ratin  At best pain ratin  At worst pain ratin  Location: R knee  Quality: tight  Aggravating factors: stair climbing, walking and standing    Social Support  Steps to enter house: yes  Stairs in house: yes   Lives in: multiple-level home    Treatments  Previous treatment: medication  Current treatment: medication and physical therapy        Objective     Observations     Right Knee   Negative for drainage, edema, effusion and incision.      Active Range of Motion   Left Knee   Flexion: 141 degrees   Extension: 5 degrees     Right Knee   Flexion: 132 degrees   Extension: 0 degrees     Passive Range of Motion   Left Knee   Extension: 5 degrees     Right Knee   Extension: 0 degrees     Mobility   Patellar Mobility:     Right Knee   WFL: medial, lateral, superior and inferior    Strength/Myotome Testing     Right Hip   Planes of Motion   Flexion: 4  Extension: 4+  Abduction: 4+  Adduction: 4+    Left Knee   Quadriceps contraction: good    Right Knee   Flexion: 4+  Extension: 4+  Quadriceps contraction: good             Precautions: N/A    Date  Reassess    FOTO  84 SC      Manuals        Passive knee ext 5 min  JF 5 min 3 min   Passive knee flexion Quad stretch off table  JF  5 min   Gastroc/ ham / quad stretch Jf 4x  30 x  JF  JF   5 min JF 5 min   Neuro Re-Ed        SLR 20 x  1# 20x     SLB-foam Foam 20 " 5 x 20" 5x 3x 15" 5 " 10 x 10 " 5 x   Tandem walk-foam 5 laps airex 5 laps 5 laps  F/B 5 laps 5 laps   Clamshell 10 x 5 "   10x 5" 10 x 5 "  10 x 5 "           Ther Ex        Heel slide   np 20 x 5 "    Towel crush   20x 5"     Leg Ext 20  #  20 x 20# 2x15 15# 2/15 20   # 20 x 15 #  2 x 15    Leg press 50  #  2x 20 50# 2x20 40# 2/20   40 # 2 x 20  40# 2 x 20    Bike for strengthening L 5 8 min L7 8 min 8m L5 8 min bike L 5 L5 8 min   Hip machine abd / ext 15 #  20 x both 30# 20x ea.    15 # 20x ea 30 # 20 x  30 #  30 x   Squats 20 x 20x 20x 20 x 20 x   Hamstring curls 40  # x  2 x20 40# 2x20  40 #  2/15   40  #  2 x 15   40 # 3x10   Ther Activity        Step downs 6 " 20 x 6" 20x 6" 20 6 " 20 x 6 " 20 x   Lateral step up 6" 20 x 6" 20x 6" 20 x 6 " 15 x  6 " 15 x   Step Ups 8 "  30 x 8" 30x 8" 30x 8 ' 30 x 8" 20x   Bridge w/ LAQ 20x   20x   15x   Gait Training                Modalities

## 2023-09-13 NOTE — LETTER
2023    Iqra [address]unez[address] [address]    Patient: Chadd Alvarado   YOB: 1960   Date of Visit: 2023     Encounter Diagnosis     ICD-10-CM    1. Presence of right artificial knee joint  Z96.651       2. Acute pain of right knee  M25.561           Dear Dr. Eunice Hernández:    Thank you for your recent referral of Chadd Alvarado. Please review the attached evaluation summary from Angie's recent visit. Please verify that you agree with the plan of care by signing the attached order. If you have any questions or concerns, please do not hesitate to call. I sincerely appreciate the opportunity to share in the care of one of your patients and hope to have another opportunity to work with you in the near future. Sincerely,    Doug Schulz, PT      Referring Provider:      I certify that I have read the below Plan of Care and certify the need for these services furnished under this plan of treatment while under my care. Iqra Nunez MD  [address] [place] [id number]  Via Fax: [phone]          PT Re-Evaluation     Today's date: 2023  Patient name: Chadd Alvarado  : 1960  MRN: [id number]  Referring provider: Iqra Nunez MD  Dx:   Encounter Diagnosis     ICD-10-CM    1. Presence of right artificial knee joint  Z96.651       2. Acute pain of right knee  M25.561           Start Time: 1400  Stop Time: 1500  Total time in clinic (min): 60 minutes    Assessment  Assessment details: Patient has attended 23 physical therapy visits to date. She is demonstrating good improvements with both mobility and strength of the R knee. She has minimal restriction to functional mobility of the R knee and continues with mild strength deficits which she feels is only limiting her ability to descend stairs. She did well with progression of strengthening interventions today.  She has great patellar mobility noted during assessment today with no restriction noted to the incision site either. Impairments: lacks appropriate home exercise program    Symptom irritability: lowUnderstanding of Dx/Px/POC: good   Prognosis: good    Goals  STGs    Patient will increase right knee flexion AROM from 91 to 110 degrees in 3 weeks to be able to use stairs with a step through pattern. - MET  Patient will increase right knee extension AROM from -8 to -3 degrees in 3 weeks to be able to tolerate normal ADL's. - MET  Patient will increase right knee flexion strength from 4 to 4+ by discharge in 3 weeks to be able to tolerate normal ADL's.- MET    LTGs    Patient will increase FOTO score from 41 to 77 by discharge to be able to complete ADL's. - MET  Patient will increase right knee extension strength from 4 to 5 by discharge to be able to tolerate normal ADL's. - MET  Patient will decrease R knee edema from 39.8 cm to 34 cm by discharge to be able to complete ADL's as normal. - MET    Plan  Plan details: Patient will have deficits addressed that were found in the evaluation today. Patient will benefit from PT to address R knee ROM and strength to be able to get back to walking their normal mileage and their normal ADL's. Patient informed that from this point forward, to ensure adherence to the aforementioned plan of care, all or some of the treatment may be performed and carried out by a Physical Therapy Assistant (PTA) with supervision from a licensed Physical Therapist (PT) in accordance with [address] [place] [address] [place]. From this point forward, all or some treatments may be carried out by a Physical Therapy Student (SPT) while under direct supervision from a licensed Physical Therapist (PT).       Planned modality interventions: cryotherapy  Planned therapy interventions: manual therapy, balance/weight bearing training, neuromuscular re-education, strengthening, stretching, therapeutic activities, therapeutic exercise and home exercise program  Duration in visits: 1  Plan of Care beginning date: 2023  Plan of Care expiration date: 2023  Treatment plan discussed with: patient        Subjective Evaluation    History of Present Illness  Mechanism of injury: surgery  Mechanism of injury: Patient presents to outpatient clinic post op R TKA. Patient reports doing well since the operation on 6/15/23 and is currently having difficulty managing the post op swelling. Patient reports they started exercises provided by their physician before the surgery and they have been continuing them. Patient expresses walking upstairs and standing for more than 20 minutes at a time has been difficult for them. Patient disclosed prior medical history of diabetes and thyroid problems. Update 2023:  Patient reports that her knee is doing well. She feels that she is no longer relying on the cane for assistance. There is improved tolerance for walking and standing activities at home. She felt that the scar massage helped with her mobility and pain last session. She feels that standing in one spot for a long period of time will still be difficult for her to tolerate. Update 2023:  Patient reports that she only has some mild stiffness in the knee when she first wakes up and when she is going down stairs. She notes that her knee does feel a little weak when she is going down the stairs but she is getting up and down from chairs without an issue.    Quality of life: good    Patient Goals  Patient goals for therapy: decreased edema, decreased pain and increased strength  Patient goal: Be able to sleep at night without pain  Pain  Current pain ratin  At best pain ratin  At worst pain ratin  Location: R knee  Quality: tight  Aggravating factors: stair climbing, walking and standing    Social Support  Steps to enter house: yes  Stairs in house: yes   Lives in: multiple-level home    Treatments  Previous treatment: medication  Current treatment: medication and physical therapy        Objective     Observations     Right Knee   Negative for drainage, edema, effusion and incision. Active Range of Motion   Left Knee   Flexion: 141 degrees   Extension: 5 degrees     Right Knee   Flexion: 132 degrees   Extension: 0 degrees     Passive Range of Motion   Left Knee   Extension: 5 degrees     Right Knee   Extension: 0 degrees     Mobility   Patellar Mobility:     Right Knee   WFL: medial, lateral, superior and inferior    Strength/Myotome Testing     Right Hip   Planes of Motion   Flexion: 4  Extension: 4+  Abduction: 4+  Adduction: 4+    Left Knee   Quadriceps contraction: good    Right Knee   Flexion: 4+  Extension: 4+  Quadriceps contraction: good            Precautions: N/A    Date 9/7 9/13 Reassess 8/29 8/31 9/5   FOTO  84 SC      Manuals        Passive knee ext 5 min  JF 5 min 3 min   Passive knee flexion Quad stretch off table  JF  5 min   Gastroc/ ham / quad stretch Jf 4x  30 x  JF  JF   5 min JF 5 min   Neuro Re-Ed        SLR 20 x  1# 20x     SLB-foam Foam 20 " 5 x 20" 5x 3x 15" 5 " 10 x 10 " 5 x   Tandem walk-foam 5 laps airex 5 laps 5 laps  F/B 5 laps 5 laps   Clamshell 10 x 5 "   10x 5" 10 x 5 "  10 x 5 "           Ther Ex        Heel slide   np 20 x 5 "    Towel crush   20x 5"     Leg Ext 20  #  20 x 20# 2x15 15# 2/15 20   # 20 x 15 #  2 x 15    Leg press 50  #  2x 20 50# 2x20 40# 2/20   40 # 2 x 20  40# 2 x 20    Bike for strengthening L 5 8 min L7 8 min 8m L5 8 min bike L 5 L5 8 min   Hip machine abd / ext 15 #  20 x both 30# 20x ea.    15 # 20x ea 30 # 20 x  30 #  30 x   Squats 20 x 20x 20x 20 x 20 x   Hamstring curls 40  # x  2 x20 40# 2x20  40 #  2/15   40  #  2 x 15   40 # 3x10   Ther Activity        Step downs 6 " 20 x 6" 20x 6" 20 6 " 20 x 6 " 20 x   Lateral step up 6" 20 x 6" 20x 6" 20 x 6 " 15 x  6 " 15 x   Step Ups 8 "  30 x 8" 30x 8" 30x 8 ' 30 x 8" 20x   Bridge w/ LAQ 20x   20x   15x   Gait Training Modalities

## 2023-09-15 ENCOUNTER — OFFICE VISIT (OUTPATIENT)
Dept: PHYSICAL THERAPY | Facility: CLINIC | Age: 63
End: 2023-09-15
Payer: COMMERCIAL

## 2023-09-15 DIAGNOSIS — Z96.651 PRESENCE OF RIGHT ARTIFICIAL KNEE JOINT: ICD-10-CM

## 2023-09-15 DIAGNOSIS — M25.561 ACUTE PAIN OF RIGHT KNEE: Primary | ICD-10-CM

## 2023-09-15 PROCEDURE — 97110 THERAPEUTIC EXERCISES: CPT

## 2023-09-15 PROCEDURE — 97112 NEUROMUSCULAR REEDUCATION: CPT

## 2023-09-15 PROCEDURE — 97530 THERAPEUTIC ACTIVITIES: CPT

## 2023-09-15 NOTE — PROGRESS NOTES
Patient has reached a functional baseline with therapy interventions and will continue with a step down program at this time.